# Patient Record
Sex: FEMALE | Race: WHITE | URBAN - METROPOLITAN AREA
[De-identification: names, ages, dates, MRNs, and addresses within clinical notes are randomized per-mention and may not be internally consistent; named-entity substitution may affect disease eponyms.]

---

## 2017-04-28 ENCOUNTER — IMPORTED ENCOUNTER (OUTPATIENT)
Dept: URBAN - METROPOLITAN AREA CLINIC 1 | Facility: CLINIC | Age: 77
End: 2017-04-28

## 2017-04-28 PROBLEM — H25.813: Noted: 2017-04-28

## 2017-04-28 PROBLEM — Z79.84: Noted: 2017-04-28

## 2017-04-28 PROBLEM — H40.1132: Noted: 2017-04-28

## 2017-04-28 PROBLEM — E11.9: Noted: 2017-04-28

## 2017-04-28 PROBLEM — H43.811: Noted: 2017-04-28

## 2017-04-28 PROCEDURE — 92133 CPTRZD OPH DX IMG PST SGM ON: CPT

## 2017-04-28 PROCEDURE — 92015 DETERMINE REFRACTIVE STATE: CPT

## 2017-04-28 PROCEDURE — 92014 COMPRE OPH EXAM EST PT 1/>: CPT

## 2017-04-28 NOTE — PATIENT DISCUSSION
1.  DM Type II (Oral Meds) without sign of diabetic retinopathy and no blot heme on dilated retinal examination today OU No Macular Edema:  Discussed the pathophysiology of diabetes and its effect on the eye and risk of blindness. Stressed the importance of strong glucose control. Advised of importance of at least yearly dilated examinations but to contact us immediately for any problems or concerns. 2. Moderate Open Angle Glaucoma OU (CD 0.8/0.75) OCT Shows mild thinning OD moderate thinning OS. Begin monotrial of Travatan Z QHS OS. Will recheck patient in 1 month for IOP check. 3.  Cataract OU: Observe for now without intervention. The patient was advised to contact us if any change or worsening of vision4. PVD w/o Tear OD - RD precautions. MRX for glasses given. Letter to PCP Return for an appointment in 1 mo 10 (Check IOP on monotrial) with Dr. Claudeen Cobble.

## 2017-05-30 ENCOUNTER — IMPORTED ENCOUNTER (OUTPATIENT)
Dept: URBAN - METROPOLITAN AREA CLINIC 1 | Facility: CLINIC | Age: 77
End: 2017-05-30

## 2017-05-30 PROBLEM — H16.143: Noted: 2017-05-30

## 2017-05-30 PROBLEM — H04.123: Noted: 2017-05-30

## 2017-05-30 PROBLEM — H40.1132: Noted: 2017-05-30

## 2017-05-30 PROCEDURE — 92012 INTRM OPH EXAM EST PATIENT: CPT

## 2017-05-30 PROCEDURE — 92015 DETERMINE REFRACTIVE STATE: CPT

## 2017-05-30 NOTE — PATIENT DISCUSSION
1.  Moderate Open Angle Glaucoma OU (CD 0.8/0.75) IOP improved OS on monotrial of Travatan despite partial compliance. Will use Travatan Z QHS OU.2.  TIERA w/ PEK OU- Begin ATs TID OU routinely. (samples of Systane Ultra given) 3. Cataract OU: Observe for now without intervention. The patient was advised to contact us if any change or worsening of vision4. H/o PVD w/o Tear OD 5. H/o DM w/o DR OU Return for an appointment in 6 months 10 VF 24-2 OU with Dr. Becky Zapata.

## 2017-08-31 NOTE — PATIENT DISCUSSION
The types of intraocular lenses were reviewed with the patient along with a discussion of their various strengths and weaknesses.

## 2017-11-27 ENCOUNTER — IMPORTED ENCOUNTER (OUTPATIENT)
Dept: URBAN - METROPOLITAN AREA CLINIC 1 | Facility: CLINIC | Age: 77
End: 2017-11-27

## 2017-11-27 PROBLEM — H43.811: Noted: 2017-11-27

## 2017-11-27 PROBLEM — H16.143: Noted: 2017-11-27

## 2017-11-27 PROBLEM — Z79.84: Noted: 2017-11-27

## 2017-11-27 PROBLEM — H25.813: Noted: 2017-11-27

## 2017-11-27 PROBLEM — E11.9: Noted: 2017-11-27

## 2017-11-27 PROBLEM — H40.1132: Noted: 2017-11-27

## 2017-11-27 PROBLEM — H04.123: Noted: 2017-11-27

## 2017-11-27 PROCEDURE — 92083 EXTENDED VISUAL FIELD XM: CPT

## 2017-11-27 PROCEDURE — 92014 COMPRE OPH EXAM EST PT 1/>: CPT

## 2017-11-27 NOTE — PATIENT DISCUSSION
1.  DM Type II (Oral Meds) without sign of diabetic retinopathy and no blot heme on dilated retinal examination today OU No Macular Edema:  Discussed the pathophysiology of diabetes and its effect on the eye and risk of blindness. Stressed the importance of strong glucose control. Advised of importance of at least yearly dilated examinations but to contact us immediately for any problems or concerns. 2. Cataract OU: Observe for now without intervention. The patient was advised to contact us if any change or worsening of vision3. TIERA w/ PEK OU- Increase ATs to TID OU Routinely. 4.  Moderate Open Angle Glaucoma OU (CD 0.8/0.75)VF shows no progression OU. IOP stable on current meds. Continue Travatan Z QHS OU. Compliance with meds. 5.  PVD w/o Tear OD - RD precautions. 6.  H/o CL Wear Letter to PCP Return for an appointment in 6 mo 10 OCT glare with Dr. Haylie Maza.

## 2018-01-16 NOTE — PATIENT DISCUSSION
Patient educated on Symfony blended vision. Patient understands that the near focal point will be at approximately 20 inches with the first eye. Near vision will be achieved with surgery on the second eye. Patient educated they may experience halos at night that are typically resolved without therapy. Patient educated there is a 1 in 500 chance there will be something about the vision that they do not like. Patient educated there is a 10% chance of needing enhancement after surgery. Patient elects Symfony OD, goal of emmetropia.

## 2018-04-09 NOTE — PATIENT DISCUSSION
This visual field clearly demonstrated a minimum of 47% loss of upper field of vision OU, with upper lid skin in repose and elevated by taping of the lid to demonstrate potential correction. This field shows that taping the lids significantly improved this patient's superior field of vision by approximately 47%, OU.

## 2018-05-29 ENCOUNTER — IMPORTED ENCOUNTER (OUTPATIENT)
Dept: URBAN - METROPOLITAN AREA CLINIC 1 | Facility: CLINIC | Age: 78
End: 2018-05-29

## 2018-05-29 PROBLEM — H40.1132: Noted: 2018-05-29

## 2018-05-29 PROCEDURE — 99213 OFFICE O/P EST LOW 20 MIN: CPT

## 2018-05-29 PROCEDURE — 92133 CPTRZD OPH DX IMG PST SGM ON: CPT

## 2018-05-29 NOTE — PATIENT DISCUSSION
1.  Moderate Open Angle Glaucoma OU (CD 0.8/0.75) OCT shows NL OD minimal temporal thinning OS Stable. IOP stable on current meds. Continue Latanoprost QHS OU. Compliance with meds. 2.  H/o DM Type II (Oral Meds) without sign of diabetic retinopathy3. Cataract OU: Observe for now without intervention. The patient was advised to contact us if any change or worsening of vision4. TIERA w/ PEK OU- Increase ATs to TID OU Routinely. Consider Xiidra vs. Restasis vs. plugs w/o improvement. 5.  PVD w/o Tear OD - RD precautions. 6.  H/o CL Wear Patient defers the refraction at today's visitReturn for an appointment in 6 MO 30 / Kory Berger / [place] / 24-2 HVF  with Dr. James Santos.

## 2018-11-27 ENCOUNTER — IMPORTED ENCOUNTER (OUTPATIENT)
Dept: URBAN - METROPOLITAN AREA CLINIC 1 | Facility: CLINIC | Age: 78
End: 2018-11-27

## 2018-11-27 PROBLEM — H25.813: Noted: 2018-11-27

## 2018-11-27 PROBLEM — H43.813: Noted: 2018-11-27

## 2018-11-27 PROBLEM — H04.123: Noted: 2018-11-27

## 2018-11-27 PROBLEM — H16.143: Noted: 2018-11-27

## 2018-11-27 PROBLEM — Z79.84: Noted: 2018-11-27

## 2018-11-27 PROBLEM — H40.1132: Noted: 2018-11-27

## 2018-11-27 PROBLEM — E11.9: Noted: 2018-11-27

## 2018-11-27 PROCEDURE — 92015 DETERMINE REFRACTIVE STATE: CPT

## 2018-11-27 PROCEDURE — 92083 EXTENDED VISUAL FIELD XM: CPT

## 2018-11-27 PROCEDURE — 92014 COMPRE OPH EXAM EST PT 1/>: CPT

## 2018-11-27 NOTE — PATIENT DISCUSSION
1.  DM Type II without sign of diabetic retinopathy and no blot heme on dilated retinal examination today OU No Macular Edema: Stable. Discussed the pathophysiology of diabetes and its effect on the eye and risk of blindness. Stressed the importance of strong glucose control. Advised of importance of at least yearly dilated examinations but to contact us immediately for any problems or concerns. 2. Type II diabetes controlled by oral medications. 3.  Cataract OU:  Visually Significant secondary to glare discussed the risks benefits alternatives and limitations of cataract surgery. The patient stated a full understanding. The patient will need to return for preop appointment with cataract measurements and to have any additional questions answered and start pre-operative eye drops as directed. **Ok to call if/when desires phaco/PCLPhaco PCL OS then ODOtherwise follow-up in 6 months 10/HVF4. Moderate Open Angle Glaucoma OU (0.75 OU)- Stable IOP OU. Normal HVF OU. Patient to continue with Latanoprost OU QHS. Patient advised to be compliant with gtts. Condition was discussed with patient and patient understands. Will continue to monitor patient for any progression in condition. Patient was advised to call us with any problems questions or concerns. 5.  TIERA w/ PEK OU- Stable. The continuation of artificial tears were recommended. 6.  PVD OU - RD precautions. No tears Observe. 7. Return for an appointment for 10/HVF in 6 months with Dr. Jia Morillo.

## 2018-12-13 ENCOUNTER — IMPORTED ENCOUNTER (OUTPATIENT)
Dept: URBAN - METROPOLITAN AREA CLINIC 1 | Facility: CLINIC | Age: 78
End: 2018-12-13

## 2018-12-13 PROBLEM — H25.812: Noted: 2018-12-13

## 2018-12-13 PROCEDURE — 92136 OPHTHALMIC BIOMETRY: CPT

## 2018-12-13 NOTE — PATIENT DISCUSSION
Cataract OS: Visually Significant secondary to glare discussed the risks benefits alternatives and limitations of cataract surgery. The patient stated a full understanding and a desire to proceed with the procedure. The patient will need to start pre-operative eye drops as directed. **Myopic goal desired per pt. Proceed w/ phaco PCL OS w/ standard lens and technique w/ iStent. Advised pt to D/C ASA today. Ok to restart ASA until 2nd eye sx is completePt understands they will need glasses post-op to achieve their best corrected vision. Return for an appointment for sx OS with Dr. Amy Montemayor.

## 2018-12-19 ENCOUNTER — IMPORTED ENCOUNTER (OUTPATIENT)
Dept: URBAN - METROPOLITAN AREA CLINIC 1 | Facility: CLINIC | Age: 78
End: 2018-12-19

## 2018-12-20 ENCOUNTER — IMPORTED ENCOUNTER (OUTPATIENT)
Dept: URBAN - METROPOLITAN AREA CLINIC 1 | Facility: CLINIC | Age: 78
End: 2018-12-20

## 2018-12-20 PROBLEM — H25.811: Noted: 2018-12-20

## 2018-12-20 PROCEDURE — 92136 OPHTHALMIC BIOMETRY: CPT

## 2018-12-20 NOTE — PATIENT DISCUSSION
1.  Cataract OD: Visually Significant secondary to glare discussed the risks benefits alternatives and limitations of cataract surgery. The patient stated a full understanding and a desire to proceed with the procedure. Pt understands they will need glasses post-op to achieve their best corrected vision. Phaco PCL OD (Standard w/ iStent) **Myopic Goal2. POD#1  CE/IOL OS (Standard w/ iStent) **Myopic Goal doing well.    Lotemax BID OS Prolensa Qdaily OS Ocuflox TID OS Cont Latanoprost QHS OU F/u as scheduled 2nd eye

## 2019-01-02 ENCOUNTER — IMPORTED ENCOUNTER (OUTPATIENT)
Dept: URBAN - METROPOLITAN AREA CLINIC 1 | Facility: CLINIC | Age: 79
End: 2019-01-02

## 2019-01-03 ENCOUNTER — IMPORTED ENCOUNTER (OUTPATIENT)
Dept: URBAN - METROPOLITAN AREA CLINIC 1 | Facility: CLINIC | Age: 79
End: 2019-01-03

## 2019-01-03 PROBLEM — Z96.1: Noted: 2019-01-03

## 2019-01-03 PROCEDURE — 99024 POSTOP FOLLOW-UP VISIT: CPT

## 2019-01-24 ENCOUNTER — IMPORTED ENCOUNTER (OUTPATIENT)
Dept: URBAN - METROPOLITAN AREA CLINIC 1 | Facility: CLINIC | Age: 79
End: 2019-01-24

## 2019-01-24 PROBLEM — Z09: Noted: 2019-01-24

## 2019-01-24 PROCEDURE — 99024 POSTOP FOLLOW-UP VISIT: CPT

## 2019-01-24 NOTE — PATIENT DISCUSSION
POW#3 Phaco/ PCL OU (Standard w/ iStent OU) doing well. *Myopic Goal*. Finish PO meds per schedule Cont Latanoprost QHS OU MRX for glasses given Return for an appointment in 4 mo 30 OCT with Dr. Ct Hilliard.

## 2019-05-23 ENCOUNTER — IMPORTED ENCOUNTER (OUTPATIENT)
Dept: URBAN - METROPOLITAN AREA CLINIC 1 | Facility: CLINIC | Age: 79
End: 2019-05-23

## 2019-05-23 PROBLEM — E11.9: Noted: 2019-05-23

## 2019-05-23 PROBLEM — Z79.84: Noted: 2019-05-23

## 2019-05-23 PROBLEM — H40.1132: Noted: 2019-05-23

## 2019-05-23 PROCEDURE — 92133 CPTRZD OPH DX IMG PST SGM ON: CPT

## 2019-05-23 PROCEDURE — 92014 COMPRE OPH EXAM EST PT 1/>: CPT

## 2019-05-23 NOTE — PATIENT DISCUSSION
1.  DM Type II (Oral Meds) without sign of diabetic retinopathy and no blot heme on dilated retinal examination today OU No Macular Edema:  Discussed the pathophysiology of diabetes and its effect on the eye and risk of blindness. Stressed the importance of strong glucose control. Advised of importance of at least yearly dilated examinations but to contact us immediately for any problems or concerns. 2. Moderate Open Angle Glaucoma OU (CD 0.8/0.75) H/o iStent OU. OCT shows no progression OU. IOP improved OU on Travatan & s/p iStent OU. Cont Travatan Z QHS OS. Will recheck patient in 1 month for IOP check. 3.  Pseudophakia OU (Standard w/ iStent OU) Myopic Target. 4.  PVD w/o Tear OU - RD precautions. Letter to PCP MRx deferredReturn for an appointment in 6 mo 10 VF 24-2 OU with Dr. Francisca Tesfaye.

## 2019-12-09 ENCOUNTER — IMPORTED ENCOUNTER (OUTPATIENT)
Dept: URBAN - METROPOLITAN AREA CLINIC 1 | Facility: CLINIC | Age: 79
End: 2019-12-09

## 2019-12-09 PROBLEM — H40.1132: Noted: 2019-12-09

## 2019-12-09 PROBLEM — H16.143: Noted: 2019-12-09

## 2019-12-09 PROBLEM — H26.493: Noted: 2019-12-09

## 2019-12-09 PROBLEM — Z96.1: Noted: 2019-12-09

## 2019-12-09 PROBLEM — H04.123: Noted: 2019-12-09

## 2019-12-09 PROCEDURE — 92083 EXTENDED VISUAL FIELD XM: CPT

## 2019-12-09 PROCEDURE — 92012 INTRM OPH EXAM EST PATIENT: CPT

## 2019-12-09 PROCEDURE — 92015 DETERMINE REFRACTIVE STATE: CPT

## 2019-12-09 NOTE — PATIENT DISCUSSION
1.  Moderate Open Angle Glaucoma OU -- (CD 0.8/0.75) H/o iStent OU. IOP stable OU w/ stated compliance. Cont Latanoprost QHS OU (erx'd). 2. PCO OU -- (Posterior Capsule Opacification) Observe and consider yag cap when pt feels pco visually significant and visual acuity decreases to appropriate level. 3. Pseudophakia OU -- (Standard w/ iStent OU) Myopic Target. 4.  TIERA w/ PEK OU -- The use/continuation of artificial tears were recommended. 5.  H/o DM Type II (Oral Meds) without sign of diabetic retinopathy and no blot heme OU No Macular Edema. 6. H/o PVD w/o Tear OUReturn for an appointment in 6 months for a 30/OCT with Dr. Bekah Chi.

## 2020-03-04 NOTE — PATIENT DISCUSSION
A1c 6.6  RISS   POCT Q6  Nutritional Consult for dietary /caloric needs     Good postoperative appearance.

## 2020-06-15 ENCOUNTER — IMPORTED ENCOUNTER (OUTPATIENT)
Dept: URBAN - METROPOLITAN AREA CLINIC 1 | Facility: CLINIC | Age: 80
End: 2020-06-15

## 2020-06-15 PROBLEM — H16.143: Noted: 2020-06-15

## 2020-06-15 PROBLEM — Z79.84: Noted: 2020-06-15

## 2020-06-15 PROBLEM — Z96.1: Noted: 2020-06-15

## 2020-06-15 PROBLEM — H43.813: Noted: 2020-06-15

## 2020-06-15 PROBLEM — H26.493: Noted: 2020-06-15

## 2020-06-15 PROBLEM — H04.123: Noted: 2020-06-15

## 2020-06-15 PROBLEM — E11.9: Noted: 2020-06-15

## 2020-06-15 PROBLEM — H40.1132: Noted: 2020-06-15

## 2020-06-15 PROCEDURE — 92014 COMPRE OPH EXAM EST PT 1/>: CPT

## 2020-06-15 PROCEDURE — 92133 CPTRZD OPH DX IMG PST SGM ON: CPT

## 2020-06-15 NOTE — PATIENT DISCUSSION
1.  DM Type II without sign of diabetic retinopathy and no blot heme on dilated retinal examination today OU No Macular Edema:  Discussed the pathophysiology of diabetes and its effect on the eye and risk of blindness. Stressed the importance of strong glucose control. Advised of importance of at least yearly dilated examinations but to contact us immediately for any problems or concerns. 2. Moderate Open Angle Glaucoma OU (CD 0.75 OU) -H/o iStent OU. No progression by OCT. IOP stable cont Latanoprost QHS OU. Patient to continue with current gtt regimen. Patient advised to be compliant with gtts. Condition was discussed with patient and patient understands. Will continue to monitor patient for any progression in condition. Patient was advised to call us with any problems questions or concerns. 3.  TIERA w/ PEK OU- Recommend ATs TID OU routinely 4. PCO OU: (Posterior Capsule Opacification)   Observe and consider yag cap when pt feels pco visually significant and visual acuity decreases to appropriate level. 5. Pseudophakia OU - (Standard w/ iStent OU) *Myopic Goal*. 6.  PVD OU - RD precautions. Patient deferred Manifest Rx today. Return for an appointment in 6 months DFE/HVF/glare with Dr. Go Khan.

## 2020-12-15 ENCOUNTER — IMPORTED ENCOUNTER (OUTPATIENT)
Dept: URBAN - METROPOLITAN AREA CLINIC 1 | Facility: CLINIC | Age: 80
End: 2020-12-15

## 2020-12-15 PROBLEM — H04.123: Noted: 2020-12-15

## 2020-12-15 PROBLEM — H26.493: Noted: 2020-12-15

## 2020-12-15 PROBLEM — H16.143: Noted: 2020-12-15

## 2020-12-15 PROBLEM — H40.1132: Noted: 2020-12-15

## 2020-12-15 PROCEDURE — 92083 EXTENDED VISUAL FIELD XM: CPT

## 2020-12-15 PROCEDURE — 92015 DETERMINE REFRACTIVE STATE: CPT

## 2020-12-15 PROCEDURE — 92014 COMPRE OPH EXAM EST PT 1/>: CPT

## 2020-12-15 NOTE — PATIENT DISCUSSION
1.  Moderate Open Angle Glaucoma OU (CD 0.75 OU) -- H/o iStent OU. HVF today WNL OD Non specific defects OS. IOP stable cont Latanoprost QHS OU. Patient to continue with current gtt regimen. Patient advised to be compliant with gtts. Condition was discussed with patient and patient understands. Will continue to monitor patient for any progression in condition. Patient was advised to call us with any problems questions or concerns. 2.  TIERA w/ increased PEK OU - Increase ATs QID OU routinely. Recommend patient switch to PF ATs (Sample given). 3.  PCO OU -- (Posterior Capsule Opacification) Observe and consider yag cap when pt feels pco visually significant and visual acuity decreases to appropriate level. 4. DM Type II (Oral Meds) without sign of diabetic retinopathy and no blot heme on dilated retinal examination today OU No Macular Edema:  Discussed the pathophysiology of diabetes and its effect on the eye and risk of blindness. Stressed the importance of strong glucose control. Advised of importance of at least yearly dilated examinations but to contact us immediately for any problems or concerns. 5.  Pseudophakia OU - (Standard w/ iStent OU) *Myopic Goal*. 6.  PVD OU - RD precautions. Return for an appointment  YAG Cap OD then OS with Dr. Kristopher Hirsch.

## 2021-01-06 ENCOUNTER — IMPORTED ENCOUNTER (OUTPATIENT)
Dept: URBAN - METROPOLITAN AREA CLINIC 1 | Facility: CLINIC | Age: 81
End: 2021-01-06

## 2021-01-13 ENCOUNTER — IMPORTED ENCOUNTER (OUTPATIENT)
Dept: URBAN - METROPOLITAN AREA CLINIC 1 | Facility: CLINIC | Age: 81
End: 2021-01-13

## 2021-02-05 ENCOUNTER — IMPORTED ENCOUNTER (OUTPATIENT)
Dept: URBAN - METROPOLITAN AREA CLINIC 1 | Facility: CLINIC | Age: 81
End: 2021-02-05

## 2021-02-05 PROCEDURE — 99024 POSTOP FOLLOW-UP VISIT: CPT

## 2021-02-05 NOTE — PATIENT DISCUSSION
PO YAG Cap OU -- good result. MRX given. Patient is interested in CTLs (Considering MF vs Distance va CLs; patient has tried mono in past and did not like them). ill have patient return prn for CTL fit with Dr. Kenji Cook. Return for an appointment in June for a 30/OCT with Dr. Shahram Mehta.

## 2021-02-26 ENCOUNTER — IMPORTED ENCOUNTER (OUTPATIENT)
Dept: URBAN - METROPOLITAN AREA CLINIC 1 | Facility: CLINIC | Age: 81
End: 2021-02-26

## 2021-02-26 NOTE — PATIENT DISCUSSION
1.  CTL Fit -- Trials given to patient (Ultra Toric MF/Ultra Toric OS). Discussed monova CTLs vs MF with astigmatism correcting lenses and pt wishes to go with the MF's. Pt has tried monova in the past and did not like them. 2.  Erx'd Latanoprost for patient today. Return for an appointment in 1 week cc with Dr. Joni Schaumann.

## 2021-03-09 ENCOUNTER — IMPORTED ENCOUNTER (OUTPATIENT)
Dept: URBAN - METROPOLITAN AREA CLINIC 1 | Facility: CLINIC | Age: 81
End: 2021-03-09

## 2021-03-09 NOTE — PATIENT DISCUSSION
1. CC Today- Good fit comfort and vision with Ultra Toric OD/Ultra for Pres OS. Changes made to axis OD (90 to 80). Sample given of Refresh Relivea for CTL/Refresh Relivea PF. CTL Mrx Arlyn Mims for an appointment in 1 yr 40/CC with Dr. Bianca Bower. Return for an appointment for Return as scheduled 07/02/2021 with Dr. Antoni Salazar.

## 2021-07-02 ENCOUNTER — IMPORTED ENCOUNTER (OUTPATIENT)
Dept: URBAN - METROPOLITAN AREA CLINIC 1 | Facility: CLINIC | Age: 81
End: 2021-07-02

## 2021-07-02 PROBLEM — Z96.1: Noted: 2021-07-02

## 2021-07-02 PROBLEM — Z79.84: Noted: 2021-07-02

## 2021-07-02 PROBLEM — H04.123: Noted: 2021-07-02

## 2021-07-02 PROBLEM — H16.143: Noted: 2021-07-02

## 2021-07-02 PROBLEM — E11.9: Noted: 2021-07-02

## 2021-07-02 PROBLEM — H40.1132: Noted: 2021-07-02

## 2021-07-02 PROCEDURE — 92133 CPTRZD OPH DX IMG PST SGM ON: CPT

## 2021-07-02 PROCEDURE — 99214 OFFICE O/P EST MOD 30 MIN: CPT

## 2021-07-02 NOTE — PATIENT DISCUSSION
1.  DM Type II (Oral Medication) without sign of diabetic retinopathy and no blot heme on dilated retinal examination today OU No Macular Edema:  Discussed the pathophysiology of diabetes and its effect on the eye and risk of blindness. Stressed the importance of strong glucose control. Advised of importance of at least yearly dilated examinations but to contact us immediately for any problems or concerns. 2. Moderate Open Angle Glaucoma OU (CD 0.75 OU) - No progression by OCT. IOP stable continue Latanoprost QHS OU. S/p iStent OU. Patient advised to be compliant with gtts. Condition was discussed with patient and patient understands. Will continue to monitor patient for any progression in condition. Patient was advised to call us with any problems questions or concerns. 3.  TIERA w/ PEK OU- No relief from ATs alone. Begin Restasis BID OU (erx). Recommend ATs TID-QID OU routinely. Consider Restasis without improvement  4. Pseudophakia OU - (Standard w/ iStent OU) *Myopic Goal*. H/o YAG Cap OU 5. PVD OU - RD precautions. Return for an appointment in November 10/HVF (check ks on Restasis) with Dr. Jia Morillo.

## 2021-11-15 ENCOUNTER — IMPORTED ENCOUNTER (OUTPATIENT)
Dept: URBAN - METROPOLITAN AREA CLINIC 1 | Facility: CLINIC | Age: 81
End: 2021-11-15

## 2021-11-15 PROBLEM — H40.1132: Noted: 2021-11-15

## 2021-11-15 PROBLEM — H16.143: Noted: 2021-11-15

## 2021-11-15 PROBLEM — H04.123: Noted: 2021-11-15

## 2021-11-15 PROCEDURE — 99213 OFFICE O/P EST LOW 20 MIN: CPT

## 2021-11-15 PROCEDURE — 92083 EXTENDED VISUAL FIELD XM: CPT

## 2021-11-15 NOTE — PATIENT DISCUSSION
1.  Moderate Open Angle Glaucoma OU (CD 0.75 OU) -- HVF shows non-specific loss OU. IOP stable continue Latanoprost QHS OU. S/p iStent OU. Patient advised to be compliant with gtts. Condition was discussed with patient and patient understands. Will continue to monitor patient for any progression in condition. Patient was advised to call us with any problems questions or concerns. 2.  TIERA w/ PEK OU -- Cont Restasis BID OU. Recommend ATs TID-QID OU routinely. 3.  Pseudophakia OU - (Standard w/ iStent OU) *Myopic Goal*. H/o YAG Cap OU 4. H/o PVD OU 5. H/o DM Type II (Oral Medication) w/o DR Flores Sin for an appointment in May/Janay 30/OCT with Dr. Estuardo Khan.

## 2022-04-03 ASSESSMENT — VISUAL ACUITY
OD_GLARE: 20/80
OS_SC: 20/40
OD_CC: J2
OU_SC: J3
OD_GLARE: 20/80
OD_SC: 20/25-1
OD_SC: 20/20-2
OS_GLARE: 20/80
OS_SC: 20/40
OD_SC: 20/25-1
OD_SC: 20/40
OD_SC: 20/20-1
OD_SC: 20/20
OS_CC: J2
OD_SC: 20/25-1
OS_GLARE: 20/80
OD_CC: J1
OS_PH: SC 20/40 -1
OD_SC: J1
OS_SC: J1
OD_SC: 20/40
OS_SC: 20/20
OS_CC: 20/100
OS_SC: 20/20
OD_SC: 20/25
OS_SC: 20/30
OS_SC: 20/30
OD_GLARE: 20/50
OD_CC: J2
OS_SC: 20/20-1
OD_GLARE: 20/80
OD_SC: 20/20-1
OD_CC: 20/80
OS_SC: 20/20
OD_SC: 20/25
OS_SC: 20/40
OD_CC: J1
OD_SC: 20/25
OS_PH: SC 20/20
OS_CC: J1
OD_SC: 20/25-1
OS_SC: 20/20
OS_CC: J2
OS_SC: 20/40
OS_GLARE: 20/40
OS_SC: 20/20-2
OD_SC: 20/25
OS_CC: 20/200
OS_SC: 20/20
OS_CC: J1
OD_CC: J2
OS_GLARE: 20/80
OS_SC: 20/25-1
OS_SC: 20/20-2
OD_CC: 20/70
OS_CC: J2
OD_GLARE: 20/60
OD_SC: 20/25
OS_CC: 20/100
OD_SC: 20/25
OD_PH: SC 20/20 -2
OS_GLARE: 20/80

## 2022-04-03 ASSESSMENT — KERATOMETRY
OD_K1POWER_DIOPTERS: 45.25
OS_AXISANGLE_DEGREES: 065
OS_AXISANGLE_DEGREES: 174
OS_K1POWER_DIOPTERS: 45.00
OS_K2POWER_DIOPTERS: 45.00
OS_AXISANGLE2_DEGREES: 047
OD_K2POWER_DIOPTERS: 44.25
OS_K2POWER_DIOPTERS: 44.25
OS_AXISANGLE_DEGREES: 138
OS_K1POWER_DIOPTERS: 45.25
OD_AXISANGLE_DEGREES: 180
OS_K2POWER_DIOPTERS: 45.00
OD_K1POWER_DIOPTERS: 45.25
OS_K1POWER_DIOPTERS: 44.75
OD_K1POWER_DIOPTERS: 45.25
OD_K1POWER_DIOPTERS: 47.50
OD_AXISANGLE_DEGREES: 082
OD_K2POWER_DIOPTERS: 44.25
OS_AXISANGLE2_DEGREES: 048
OD_AXISANGLE2_DEGREES: 088
OD_AXISANGLE_DEGREES: 016
OD_AXISANGLE2_DEGREES: 172
OS_AXISANGLE2_DEGREES: 155
OS_K1POWER_DIOPTERS: 45.00
OS_K1POWER_DIOPTERS: 45.50
OS_AXISANGLE2_DEGREES: 068
OD_AXISANGLE_DEGREES: 178
OD_K2POWER_DIOPTERS: 43.75
OS_AXISANGLE_DEGREES: 137
OD_K2POWER_DIOPTERS: 48.25
OS_K2POWER_DIOPTERS: 44.50
OD_K1POWER_DIOPTERS: 45.25
OD_AXISANGLE2_DEGREES: 106
OD_AXISANGLE2_DEGREES: 097
OD_AXISANGLE2_DEGREES: 090
OS_K2POWER_DIOPTERS: 44.75
OS_AXISANGLE_DEGREES: 158
OS_AXISANGLE2_DEGREES: 084
OD_AXISANGLE_DEGREES: 007
OD_K2POWER_DIOPTERS: 43.75

## 2022-04-03 ASSESSMENT — TONOMETRY
OD_IOP_MMHG: 15
OD_IOP_MMHG: 19
OS_IOP_MMHG: 13
OD_IOP_MMHG: 13
OS_IOP_MMHG: 17
OD_IOP_MMHG: 15
OD_IOP_MMHG: 19
OS_IOP_MMHG: 16
OS_IOP_MMHG: 15
OS_IOP_MMHG: 19
OS_IOP_MMHG: 21
OS_IOP_MMHG: 16
OS_IOP_MMHG: 13
OS_IOP_MMHG: 15
OD_IOP_MMHG: 16
OD_IOP_MMHG: 13
OD_IOP_MMHG: 14
OS_IOP_MMHG: 14
OD_IOP_MMHG: 15
OD_IOP_MMHG: 15
OD_IOP_MMHG: 16
OD_IOP_MMHG: 12
OD_IOP_MMHG: 14
OS_IOP_MMHG: 15
OD_IOP_MMHG: 18
OS_IOP_MMHG: 14
OS_IOP_MMHG: 15
OS_IOP_MMHG: 14
OD_IOP_MMHG: 13
OS_IOP_MMHG: 13

## 2022-05-16 ENCOUNTER — COMPREHENSIVE EXAM (OUTPATIENT)
Dept: URBAN - METROPOLITAN AREA CLINIC 1 | Facility: CLINIC | Age: 82
End: 2022-05-16

## 2022-05-16 DIAGNOSIS — H04.123: ICD-10-CM

## 2022-05-16 DIAGNOSIS — H16.143: ICD-10-CM

## 2022-05-16 DIAGNOSIS — H40.1132: ICD-10-CM

## 2022-05-16 DIAGNOSIS — H43.813: ICD-10-CM

## 2022-05-16 DIAGNOSIS — E11.9: ICD-10-CM

## 2022-05-16 PROCEDURE — 92133 CPTRZD OPH DX IMG PST SGM ON: CPT

## 2022-05-16 PROCEDURE — 99214 OFFICE O/P EST MOD 30 MIN: CPT

## 2022-05-16 ASSESSMENT — VISUAL ACUITY
OD_SC: J1+
OS_CC: J1
OS_SC: J1+
OD_CC: J1
OD_CC: 20/20
OS_CC: 20/20

## 2022-05-16 ASSESSMENT — TONOMETRY
OD_IOP_MMHG: 15
OS_IOP_MMHG: 15

## 2022-05-16 NOTE — PATIENT DISCUSSION
(CD 0.75 OU) - IOP stable 15 OU. OCT w/o progression OU. Continue Latanoprost QHS OU. S/p iStent OU. Patient advised to be compliant with gtts. Condition was discussed with patient and patient understands. Will continue to monitor patient for any progression in condition. Patient was advised to call us with any problems questions or concerns.

## 2022-11-22 ENCOUNTER — FOLLOW UP (OUTPATIENT)
Dept: URBAN - METROPOLITAN AREA CLINIC 1 | Facility: CLINIC | Age: 82
End: 2022-11-22

## 2022-11-22 DIAGNOSIS — H40.1132: ICD-10-CM

## 2022-11-22 PROCEDURE — 99213 OFFICE O/P EST LOW 20 MIN: CPT

## 2022-11-22 PROCEDURE — 92083 EXTENDED VISUAL FIELD XM: CPT

## 2022-11-22 ASSESSMENT — TONOMETRY
OS_IOP_MMHG: 17
OD_IOP_MMHG: 16

## 2022-11-22 ASSESSMENT — VISUAL ACUITY
OS_CC: 20/20
OU_CC: J1+
OD_CC: 20/20

## 2022-11-22 NOTE — PATIENT DISCUSSION
(CD 0.75 OU)  HVF WNL OU. IOP stable. Continue Latanoprost QHS OU. S/p iStent OU. Patient advised to be compliant with gtts. Condition was discussed with patient and patient understands. Will continue to monitor patient for any progression in condition. Patient was advised to call us with any problems questions or concerns.

## 2024-06-10 ENCOUNTER — COMPREHENSIVE EXAM (OUTPATIENT)
Dept: URBAN - METROPOLITAN AREA CLINIC 1 | Facility: CLINIC | Age: 84
End: 2024-06-10

## 2024-06-10 DIAGNOSIS — H04.123: ICD-10-CM

## 2024-06-10 DIAGNOSIS — H16.143: ICD-10-CM

## 2024-06-10 DIAGNOSIS — H43.813: ICD-10-CM

## 2024-06-10 DIAGNOSIS — Z96.1: ICD-10-CM

## 2024-06-10 DIAGNOSIS — H40.1132: ICD-10-CM

## 2024-06-10 DIAGNOSIS — E11.9: ICD-10-CM

## 2024-06-10 PROCEDURE — 92133 CPTRZD OPH DX IMG PST SGM ON: CPT

## 2024-06-10 PROCEDURE — 99214 OFFICE O/P EST MOD 30 MIN: CPT

## 2024-06-10 ASSESSMENT — VISUAL ACUITY
OD_CC: 20/25-2
OS_CC: 20/20-2

## 2024-06-10 ASSESSMENT — TONOMETRY
OD_IOP_MMHG: 19
OS_IOP_MMHG: 21

## 2024-12-13 ENCOUNTER — FOLLOW UP (OUTPATIENT)
Age: 84
End: 2024-12-13

## 2024-12-13 DIAGNOSIS — H40.1132: ICD-10-CM

## 2024-12-13 PROCEDURE — 92083 EXTENDED VISUAL FIELD XM: CPT

## 2024-12-13 PROCEDURE — 99213 OFFICE O/P EST LOW 20 MIN: CPT

## 2025-01-17 NOTE — PATIENT DISCUSSION
Patient understands condition, prognosis and need for follow up care. normal mood with appropriate affect , good eye contact

## 2025-01-30 ENCOUNTER — CLINIC PROCEDURE ONLY (OUTPATIENT)
Age: 85
End: 2025-01-30

## 2025-01-30 DIAGNOSIS — H40.1132: ICD-10-CM

## 2025-01-30 PROCEDURE — 66030 INJECTION TREATMENT OF EYE: CPT

## 2025-02-06 ENCOUNTER — CLINIC PROCEDURE ONLY (OUTPATIENT)
Age: 85
End: 2025-02-06

## 2025-02-06 DIAGNOSIS — H40.1132: ICD-10-CM

## 2025-02-06 PROCEDURE — 66030 INJECTION TREATMENT OF EYE: CPT

## 2025-02-25 ENCOUNTER — POST-OP (OUTPATIENT)
Age: 85
End: 2025-02-25

## 2025-02-25 DIAGNOSIS — H40.1132: ICD-10-CM

## 2025-02-25 PROCEDURE — 66999PO NON CO-MANAGED OTHER SURGERY PO

## 2025-02-26 ENCOUNTER — COMPREHENSIVE EXAM (OUTPATIENT)
Age: 85
End: 2025-02-26

## 2025-02-26 DIAGNOSIS — H52.4: ICD-10-CM

## 2025-02-26 DIAGNOSIS — H52.13: ICD-10-CM

## 2025-02-26 DIAGNOSIS — H52.223: ICD-10-CM

## 2025-02-26 DIAGNOSIS — Z01.00: ICD-10-CM

## 2025-02-26 PROCEDURE — 92015 DETERMINE REFRACTIVE STATE: CPT

## 2025-02-26 PROCEDURE — 92014 COMPRE OPH EXAM EST PT 1/>: CPT

## 2025-08-19 ENCOUNTER — COMPREHENSIVE EXAM (OUTPATIENT)
Age: 85
End: 2025-08-19

## 2025-08-19 DIAGNOSIS — H43.813: ICD-10-CM

## 2025-08-19 DIAGNOSIS — E11.9: ICD-10-CM

## 2025-08-19 DIAGNOSIS — Z96.1: ICD-10-CM

## 2025-08-19 DIAGNOSIS — H16.143: ICD-10-CM

## 2025-08-19 DIAGNOSIS — H40.1131: ICD-10-CM

## 2025-08-19 DIAGNOSIS — H04.123: ICD-10-CM

## 2025-08-19 PROCEDURE — 92133 CPTRZD OPH DX IMG PST SGM ON: CPT

## 2025-08-19 PROCEDURE — 99214 OFFICE O/P EST MOD 30 MIN: CPT
